# Patient Record
(demographics unavailable — no encounter records)

---

## 2025-04-14 NOTE — PLAN
[FreeTextEntry1] : Well woman visit  pap done std cultures done OCP renewed-RBAD, I discussed the risk of dvt and emboli  from ocp rt in 1 year Rt for genetic testing

## 2025-04-14 NOTE — HISTORY OF PRESENT ILLNESS
[FreeTextEntry1] : 28 yo here for initial visit. She has no complaints today. She is doing well on vlad-ocp, needs refill. Her mother just had in jan. 2025 a preventative mastectomy, had elevated lifetime risk score. She was dx after tissue extracted that she has right breast lobular carcinoma. Pt would like genetic testing and monitoring.  She is a special ed. teacher.

## 2025-04-18 NOTE — HISTORY OF PRESENT ILLNESS
[FreeTextEntry1] : 28 yo here for genetic testing. I discussed the small risk of having a genetic mutation predisposing her to a risk of breast or ovarian cancer. I discussed her extremely elevated risk of having these cancers in the event of a mutation. I discussed strategies to reduce risk including chemoprophylaxis, increased surveillance and prophylactic surgery.  I also discussed the autosomal dominant mode of transmission as well as limitations of the test.   After all her questions were answered the blood was drawn.

## 2025-07-21 NOTE — HISTORY OF PRESENT ILLNESS
[FreeTextEntry1] : CPE [de-identified] : Patient is a 27-year-old with PMH ITP (s/p IVIG), PFO s/p repair, anxiety presenting for NPA/CPE. Patient reports a hx ITP in 2014. Presenting symptom was bruising. [place] [facility] [place]. She followed up with heme after discharge. Baseline platelets 180s-200s now. Patient also reports a strong family hx breast cancer (mother diagnosed at age 55 and maternal grandmother diagnosed in her 60s). She had genetic testing done that showed [facility] lifetime breast cancer risk of 26.2%. She will be doing alternating mammo and breast MRI when eligible. Patient reports daily nicotine vaping, but she wants to quit. Patient also reports diarrhea/loose stools for > 1 month. Initially when symptoms first started, 30-45 minutes after eating, she would get sharp abdominal pain and have a BM, then feel better. Now, she describes burning in her lower abdomen every 15-20 minutes with sudden urge to have a BM. She had a colonoscopy and EGD 10 years ago (both normal). She reports she is having 3-4 loose BMs daily with mucus and bright red blood. Sometimes the stools are watery, but not every time. She has lost 6 lbs in 3 weeks. She has been taking pepcid 20mg BID, which has helped the burning pain. She reports the pain is worse when she is hungry or immediately after eating. She reports recent antibiotic use with Macrobid and amoxicillin. She reports her diet is high in spicy foods (uses a lot of hot sauce). She denies fevers/chills. No one in the home has similar symptoms.  Last pap- 04/2025 Last eye exam- a long time ago Last skin cancer screening- a long time ago

## 2025-07-21 NOTE — HEALTH RISK ASSESSMENT
[Yes] : Yes [2 - 3 times a week (3 pts)] : 2 - 3  times a week (3 points) [3 or 4 (1 pt)] : 3 or 4  (1 point) [Less than monthly (1 pt)] : Less than monthly (1 point) [No] : In the past 12 months have you used drugs other than those required for medical reasons? No [No falls in past year] : Patient reported no falls in the past year [0] : 2) Feeling down, depressed, or hopeless: Not at all (0) [PHQ-2 Negative - No further assessment needed] : PHQ-2 Negative - No further assessment needed [Good] : ~his/her~  mood as  good [No Retinopathy] : No retinopathy [With Significant Other] : lives with significant other [Employed] : employed [Significant Other] : lives with significant other [Sexually Active] : sexually active [Feels Safe at Home] : Feels safe at home [Never] : Never [Patient reported PAP Smear was normal] : Patient reported PAP Smear was normal [Fully functional (bathing, dressing, toileting, transferring, walking, feeding)] : Fully functional (bathing, dressing, toileting, transferring, walking, feeding) [Fully functional (using the telephone, shopping, preparing meals, housekeeping, doing laundry, using] : Fully functional and needs no help or supervision to perform IADLs (using the telephone, shopping, preparing meals, housekeeping, doing laundry, using transportation, managing medications and managing finances) [Reports normal functional visual acuity (ie: able to read med bottle)] : Reports normal functional visual acuity [Smoke Detector] : smoke detector [Carbon Monoxide Detector] : carbon monoxide detector [Safety elements used in home] : safety elements used in home [Seat Belt] :  uses seat belt [Sunscreen] : uses sunscreen [Audit-CScore] : 5 [de-identified] : walking 3-4x/week [de-identified] : well-rounded, chicken and veggies daily, drinks a lot of water. Cut out caffeine, doesn't snack a lot [[id number]] : 0 [EyeExamDate] : a long time ago [High Risk Behavior] : no high risk behavior [Reports changes in hearing] : Reports no changes in hearing [Reports changes in vision] : Reports no changes in vision [Reports changes in dental health] : Reports no changes in dental health [Travel to Developing Areas] : does not  travel to developing areas [TB Exposure] : is not being exposed to tuberculosis [Caregiver Concerns] : does not have caregiver concerns [MammogramDate] : N/A [PapSmearDate] : 04/25 [BoneDensityDate] : N/A [ColonoscopyDate] : N/A [FreeTextEntry2] :

## 2025-07-21 NOTE — PLAN
[FreeTextEntry1] : Diarrhea: - CBC, CMP, inflammatory markers, and stool studies ordered today - Burning sensation possibly due to duodenal ulcer. Continue use of pepcid 20mg BID. Do not start PPI until after H. pylori testing is completed - Recommend f/u with GI for repeat EGD and colonoscopy - No visual or joint changes - Recommend GERD diet including avoidance of alcohol, spicy foods, citrus foods, peppermint, carbonated beverages and caffeine. Recommend patient avoid laying down for 2-3 hours after eating, as this may exacerbate GERD symptoms.  Anxiety: - Chronic, stable - Managed with escitalopram 20mg, renewed today - Symptoms are well controlled with current regimen - If platelet count is low, will recommend switching to bupropion to reduce risk of bleeding  Nicotine dependence: - Chronic, stable - Strongly recommend patient quit vaping to reduce risk of infections, cancer - Nicotine patch and gum ordered today. Reviewed proper administration and side effects - Recommend use of noiseless deep breathing whistle for tactile fix - Advised patient to remove vape from the home to reduce temptation  ITP: - Chronic, stable - CBC ordered today - No signs/symptoms of abnormal bruising  HCM: - Patient did not fast today. Script provided to patient to have fasting blood work done at outside lab. - Next pap- 04/2026 - Advised patient to make appt for eye exam - Dermatology referral placed for skin cancer screening